# Patient Record
Sex: FEMALE
[De-identification: names, ages, dates, MRNs, and addresses within clinical notes are randomized per-mention and may not be internally consistent; named-entity substitution may affect disease eponyms.]

---

## 2017-02-10 ENCOUNTER — TRANSCRIPTION ENCOUNTER (OUTPATIENT)
Age: 42
End: 2017-02-10

## 2019-08-26 ENCOUNTER — FORM ENCOUNTER (OUTPATIENT)
Age: 44
End: 2019-08-26

## 2020-09-17 ENCOUNTER — FORM ENCOUNTER (OUTPATIENT)
Age: 45
End: 2020-09-17

## 2021-10-08 ENCOUNTER — FORM ENCOUNTER (OUTPATIENT)
Age: 46
End: 2021-10-08

## 2022-07-28 ENCOUNTER — FORM ENCOUNTER (OUTPATIENT)
Age: 47
End: 2022-07-28

## 2022-11-01 ENCOUNTER — FORM ENCOUNTER (OUTPATIENT)
Age: 47
End: 2022-11-01

## 2023-03-10 PROBLEM — Z00.00 ENCOUNTER FOR PREVENTIVE HEALTH EXAMINATION: Status: ACTIVE | Noted: 2023-03-10

## 2023-03-13 ENCOUNTER — APPOINTMENT (OUTPATIENT)
Dept: SURGERY | Facility: CLINIC | Age: 48
End: 2023-03-13
Payer: COMMERCIAL

## 2023-03-13 VITALS
SYSTOLIC BLOOD PRESSURE: 127 MMHG | BODY MASS INDEX: 26.2 KG/M2 | HEART RATE: 69 BPM | DIASTOLIC BLOOD PRESSURE: 86 MMHG | WEIGHT: 163 LBS | HEIGHT: 66 IN

## 2023-03-13 DIAGNOSIS — Z78.9 OTHER SPECIFIED HEALTH STATUS: ICD-10-CM

## 2023-03-13 PROCEDURE — 99203 OFFICE O/P NEW LOW 30 MIN: CPT

## 2023-03-13 NOTE — CONSULT LETTER
[Dear  ___] : Dear ~MUKUND, [Consult Letter:] : I had the pleasure of evaluating your patient, [unfilled]. [Consult Closing:] : Thank you very much for allowing me to participate in the care of this patient.  If you have any questions, please do not hesitate to contact me. [Sincerely,] : Sincerely, [FreeTextEntry3] : Santino Rodriguez MD\par

## 2023-03-13 NOTE — ASSESSMENT
[FreeTextEntry1] : Ms. FLORES is a 47 year y/o F who presents w cc of having a lump to the left axilla, on exam, she was found to have a palpable lymph node to the L axilla, w some discomfort w palpation.

## 2023-03-13 NOTE — HISTORY OF PRESENT ILLNESS
[de-identified] : ADRIAN FLORES is a 47 year old F who is referred to the office for consultation visit, she presents w the cc of having a lump to the left axilla, which she's felt for 1 year. She has soreness to the area, and notes an increase in size. She has a lot of discomfort, more recently. 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 Mass contains some non lipomatous elements.

## 2023-03-13 NOTE — DATA REVIEWED
[FreeTextEntry1] : Patient: ADRIAN FLORES\par YOB: 1975\par Phone: [phone]\par MRN: [id number] Acc: [id number]\par Date of Exam: 07-\par  \par EXAM: DIGITAL UNILATERAL LEFT DIAGNOSTIC MAMMOGRAM WITH CAD AND BREAST ULTRASOUND\par \par HISTORY: The patient is seen for palpable mass in the left axilla. There is no personal history of breast cancer. Family history of breast cancer: Maternal grandmother age 47.\par Age: 46 years old. \par \par CLINICAL BREAST EXAMINATION: The patient reports that her last clinical breast exam was within the past year. \par \par COMPARISON: Prior studies the most recent dated October 9, 2021.\par \par MAMMOGRAM:\par \par TECHNIQUE: Full-field digital mammography of the left breast was obtained. Additional imaging is composed of CC and MLO views. Spot compression MLO view of the left axilla. Computer-aided detection (CAD) was utilized. \par \par FINDINGS:\par BREAST COMPOSITION: The breasts are heterogeneously dense, which may obscure small masses. \par \par No suspicious masses, architectural distortion, or significant calcifications are detected. There is a fatty mass identified superficially in the left axilla measuring 2.6 cm compatible with a lipoma. \par \par BREAST ULTRASOUND:  \par \par TECHNIQUE: Targeted left breast ultrasound was performed. \par \par FINDINGS: \par BREAST ECHOTEXTURE: There is a heterogeneous background echotexture. \par \par There is a superficial mass predominantly fatty in composition measuring 2.2 cm corresponding to the mammographic finding. There are some non lipomatous elements including linear echogenic septations. No suspicious cystic or solid masses are seen in the visualized left breast tissue. No significant axillary lymphadenopathy is seen. \par \par IMPRESSION:\par \par 1. No mammographic or ultrasonographic evidence of breast malignancy.\par 2. 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 Mass contains some non lipomatous elements. Recommend surgical consultation for possible excision.  \par \par FOLLOW-UP: Annual screening. \par ASSESSMENT: BI-RADS Category 2:  Benign.

## 2023-03-13 NOTE — PLAN
[FreeTextEntry1] : most recent imaging from 07/22 reviewed \par all of the options ,risks and benefits were discussed, including surgical excision \par recommended repeat US of the L axillary palpable finding \par F/U after the study \par \par Patient's questions and concerns addressed to their satisfaction, and patient verbalized an understanding of the information discussed.\par

## 2023-03-13 NOTE — PHYSICAL EXAM
[No Rash or Lesion] : No rash or lesion [Alert] : alert [Oriented to Person] : oriented to person [Oriented to Place] : oriented to place [Oriented to Time] : oriented to time [Calm] : calm [de-identified] : A/Ox3; NAD. appears comfortable [de-identified] : EOMI; sclera anicteric. [de-identified] : no cervical lymphadenopathy  [de-identified] : airway patent, no use of accessory muscles [de-identified] : palpable lymph node, left axilla, tender w palpation  [de-identified] : abd is soft, NT/ND\par  [de-identified] : +ROM, normal gait

## 2023-03-23 ENCOUNTER — FORM ENCOUNTER (OUTPATIENT)
Age: 48
End: 2023-03-23

## 2023-04-04 ENCOUNTER — FORM ENCOUNTER (OUTPATIENT)
Age: 48
End: 2023-04-04

## 2023-05-08 PROBLEM — R22.32 MASS OF LEFT AXILLA: Status: ACTIVE | Noted: 2023-03-13

## 2023-05-09 ENCOUNTER — NON-APPOINTMENT (OUTPATIENT)
Age: 48
End: 2023-05-09

## 2023-05-09 ENCOUNTER — APPOINTMENT (OUTPATIENT)
Dept: BREAST CENTER | Facility: CLINIC | Age: 48
End: 2023-05-09
Payer: COMMERCIAL

## 2023-05-09 VITALS
DIASTOLIC BLOOD PRESSURE: 77 MMHG | WEIGHT: 170 LBS | SYSTOLIC BLOOD PRESSURE: 122 MMHG | BODY MASS INDEX: 28.32 KG/M2 | HEART RATE: 80 BPM | HEIGHT: 65 IN

## 2023-05-09 DIAGNOSIS — Z78.9 OTHER SPECIFIED HEALTH STATUS: ICD-10-CM

## 2023-05-09 DIAGNOSIS — R22.32 LOCALIZED SWELLING, MASS AND LUMP, LEFT UPPER LIMB: ICD-10-CM

## 2023-05-09 PROCEDURE — 99214 OFFICE O/P EST MOD 30 MIN: CPT

## 2023-05-09 NOTE — DATA REVIEWED
[FreeTextEntry1] : 11/2/22 B/L sMMG ([facility]): Scattered density. No mammographic evidence of malignancy. BIRADS 1 - Negative. Follow up with annual screening.\par \par 3/24/23 L Targeted US ([facility]): An abnormal left axillary lymph node at the area of palpable concern measures 11 x 7 x 10 mm. There is focal bulging of the cortex measuring 5 mm. The finding is suspicious.\par BIRADS 4. 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 Flow cytometric analysis interpretation: The B-cells comprise 22% of total cells analyzed and express polytypic surface immunoglobulin. The T-cell show no loss of pan T-cell antigens. The CD4:CD8 ratio is normal (5:1).  There is no evidence of a non-Hodgkin lymphoma by cell marker analysis. Benign, concordant recommend 6 month follow up US.

## 2023-05-09 NOTE — REASON FOR VISIT
[Consultation] : a consultation visit [FreeTextEntry1] : s/p benign biopsy of L axillary lymph node

## 2023-05-09 NOTE — ASSESSMENT
[FreeTextEntry1] : 48 yo F referred by Dr. Santino Rodriguez presents for consultation of L axillary lymph node with focal cortical thickening s/p L US biopsy on 4/5/23 results showed lymph node with reactive follicular lymphoid hyperplasia, benign and concordant, minimally palpated on physical exam today.  \par \par Patient is a 47 year female with a strong family history of breast cancer, lifetime KTAHY risk of 23.1%. Discussed with the patient the implications for her lifetime risk, which is considered to be at high risk to develop breast cancer over the span of her lifetime and it is recommended that she undergoes high risk screening surveillance to include biannual radiological screening exams with a mammogram and screening MRI.\par \par Plan for high risk B/L MRI now, and if benign, plan for B/L sMMG/US with targeted views to L axilla in Nov 2023. Patient verbalizes understanding and is in agreement with the plan.\par

## 2023-05-09 NOTE — PHYSICAL EXAM
[No Supraclavicular Adenopathy] : no supraclavicular adenopathy [Examined in the supine and seated position] : examined in the supine and seated position [Asymmetrical] : asymmetrical [No dominant masses] : no dominant masses in right breast  [No Nipple Retraction] : no left nipple retraction [No Nipple Discharge] : no left nipple discharge [No Axillary Lymphadenopathy] : no right axillary lymphadenopathy [Supple] : supple [Breast Nipple Inversion Right] : nipple inverted [de-identified] : R nipple inverted at baseline [de-identified] : minimally palpable lymph node

## 2023-05-09 NOTE — HISTORY OF PRESENT ILLNESS
[FreeTextEntry1] : 46 yo F referred by Dr. Santino Rodriguez presents for consultation of L axillary lymph node with focal cortical thickening s/p L US biopsy on 4/5/23 results showed lymph node with reactive follicular lymphoid hyperplasia, benign and concordant recommending 6 month follow up US. First palpated by patient in Feb 2022. Patient denies infection or vaccine prior to palpating the lymph node. Denies injuries to L arm. Patient states she does not have a cat. Denies systemic symptoms including fever, chills, fatigue, weight loss. Denies any history of breast surgeries or biopsies. Denies skin changes, nipple changes, or nipple discharge bilaterally.\par \par Patient reports family history of breast cancer in [facility] at age 45. Denies famhx of ovarian cancer. \par KATHY 23.1% \par \par \par

## 2023-05-09 NOTE — PAST MEDICAL HISTORY
[Menarche Age ____] : age at menarche was [unfilled] [Definite ___ (Date)] : the last menstrual period was [unfilled] [Regular Cycle Intervals] : have been regular [Total Preg ___] : G[unfilled] [Live Births ___] : P[unfilled]  [Age At Live Birth ___] : Age at live birth: [unfilled] [History of Hormone Replacement Treatment] : has no history of hormone replacement treatment [FreeTextEntry6] : no [FreeTextEntry7] : yes  [FreeTextEntry8] : yes

## 2023-06-20 ENCOUNTER — NON-APPOINTMENT (OUTPATIENT)
Age: 48
End: 2023-06-20

## 2023-11-07 ENCOUNTER — APPOINTMENT (OUTPATIENT)
Dept: BREAST CENTER | Facility: CLINIC | Age: 48
End: 2023-11-07
Payer: COMMERCIAL

## 2023-11-07 VITALS
HEART RATE: 80 BPM | BODY MASS INDEX: 28.66 KG/M2 | SYSTOLIC BLOOD PRESSURE: 112 MMHG | HEIGHT: 65 IN | DIASTOLIC BLOOD PRESSURE: 74 MMHG | WEIGHT: 172 LBS

## 2023-11-07 DIAGNOSIS — R92.8 OTHER ABNORMAL AND INCONCLUSIVE FINDINGS ON DIAGNOSTIC IMAGING OF BREAST: ICD-10-CM

## 2023-11-07 PROCEDURE — 99213 OFFICE O/P EST LOW 20 MIN: CPT

## 2023-11-17 ENCOUNTER — NON-APPOINTMENT (OUTPATIENT)
Age: 48
End: 2023-11-17

## 2024-01-09 ENCOUNTER — NON-APPOINTMENT (OUTPATIENT)
Age: 49
End: 2024-01-09

## 2024-03-23 ENCOUNTER — FORM ENCOUNTER (OUTPATIENT)
Age: 49
End: 2024-03-23

## 2024-04-04 ENCOUNTER — FORM ENCOUNTER (OUTPATIENT)
Age: 49
End: 2024-04-04

## 2024-05-20 ENCOUNTER — NON-APPOINTMENT (OUTPATIENT)
Age: 49
End: 2024-05-20

## 2024-05-23 PROBLEM — Z91.89 AT HIGH RISK FOR BREAST CANCER: Status: ACTIVE | Noted: 2023-05-09

## 2024-05-23 PROBLEM — Z80.3 FAMILY HISTORY OF MALIGNANT NEOPLASM OF BREAST: Status: ACTIVE | Noted: 2023-05-09

## 2024-05-23 PROBLEM — Z98.890 S/P BREAST BIOPSY, RIGHT: Status: ACTIVE | Noted: 2023-11-17

## 2024-05-24 ENCOUNTER — NON-APPOINTMENT (OUTPATIENT)
Age: 49
End: 2024-05-24

## 2024-05-28 ENCOUNTER — APPOINTMENT (OUTPATIENT)
Dept: BREAST CENTER | Facility: CLINIC | Age: 49
End: 2024-05-28
Payer: COMMERCIAL

## 2024-05-28 VITALS
SYSTOLIC BLOOD PRESSURE: 122 MMHG | HEIGHT: 65 IN | DIASTOLIC BLOOD PRESSURE: 80 MMHG | WEIGHT: 174 LBS | BODY MASS INDEX: 28.99 KG/M2 | HEART RATE: 71 BPM

## 2024-05-28 DIAGNOSIS — Z98.890 OTHER SPECIFIED POSTPROCEDURAL STATES: ICD-10-CM

## 2024-05-28 DIAGNOSIS — Z80.3 FAMILY HISTORY OF MALIGNANT NEOPLASM OF BREAST: ICD-10-CM

## 2024-05-28 DIAGNOSIS — Z91.89 OTHER SPECIFIED PERSONAL RISK FACTORS, NOT ELSEWHERE CLASSIFIED: ICD-10-CM

## 2024-05-28 PROCEDURE — 99213 OFFICE O/P EST LOW 20 MIN: CPT

## 2024-06-05 NOTE — PHYSICAL EXAM
[de-identified] : R nipple inverted at baseline [de-identified] : minimally palpable lymph node

## 2024-06-05 NOTE — PAST MEDICAL HISTORY
[History of Hormone Replacement Treatment] : has no history of hormone replacement treatment [FreeTextEntry6] : no [FreeTextEntry7] : yes  [FreeTextEntry8] : yes

## 2024-06-05 NOTE — ASSESSMENT
[FreeTextEntry1] : 49 [facility] b/l benign biopsies-- of L axillary LN in April 2023 and of R breast mass in November 2023.   KATHY 23.1%. Patient without complaint. Physical exam WNL. Most recent imaging reviewed, R DX MMG/US 5/28/24  BIRADS-2 and B/L high risk MRI 5/28/24 is still pending -- will f/u with results. Plan for B/L sMMG/US and re-examination in Nov 2024. Patient verbalizes understanding and is in agreement with the plan.

## 2024-06-05 NOTE — HISTORY OF PRESENT ILLNESS
[FreeTextEntry1] : 49 yo F presents for follow up for screening with famhx of breast cancer and personal h/o b/l biopsies, noted below. Denies any history of breast surgeries. Denies skin changes, nipple changes, or nipple discharge bilaterally.  Biopsy Hx:  - L axillary lymph node with focal cortical thickening s/p L US biopsy on on 4/5/23, pathology results showed lymph node with reactive follicular lymphoid hyperplasia, benign and concordant  First palpated by patient in Feb 2022. Patient denied infection or vaccine prior to palpating the lymph node. Denied injuries to L arm. Patient states she does not have a cat. Denied systemic symptoms including fever, chills, fatigue, weight loss.  - US-guided biopsy on 11/8/23 of 0.8 cm complex hypoechoic mass or intraductal filling defect at the right 2:00 retroareolar region, path revealed benign breast parenchyma with nodular stromal fibrosis; benign and concordant; initially seen on screening US.  Imaging: B/L sMMG/US 11/7/23 BIRADS-4A prompted biopsy noted above. Most recent:  -- R DX MMG/US 5/28/24 to follow up benign biopsy, BIRADS-2. -- B/L high risk MRI 5/28/24 is still pending.  Patient reports family history of [place] [facility] [place] 45. Denies famhx of ovarian cancer.  KATHY 23.1%

## 2024-06-05 NOTE — DATA REVIEWED
[FreeTextEntry1] : 11/2/22 B/L sMMG (R): Scattered density. No mammographic evidence of malignancy. BIRADS 1 - Negative. Follow up with annual screening.  3/24/23 L Targeted US ([facility]): An abnormal left axillary lymph node at the area of palpable concern measures 11 x 7 x 10 mm. There is focal bulging of the cortex measuring 5 mm. The finding is suspicious. BIRADS 4.   4/5/23 L US Biopsy ([facility]): Left axilla shows lymph node with reactive follicular lymphoid hyperplasia. Flow cytometric analysis interpretation: The B-cells comprise 22% of total cells analyzed and express polytypic surface immunoglobulin. The T-cell show no loss of pan T-cell antigens. The CD4:CD8 ratio is normal (5:1).  There is no evidence of a non-Hodgkin lymphoma by cell marker analysis. Benign, concordant recommend 6 month follow up US.  6/15/23 B/L MRI (R)-  No MR evidence of malignancy.  Inverted right nipple has a similar appearance since prior mammograms dated back to 12/22/2016.Within the right middle lobe, there is patchy T2 signal, which likely corresponds to an area of bronchiectasis described on CT dated 3/26/2022 and 11/2/2022. BIRADS 2  11/7/23 B/L sMMG/US (R)- scattered areas of fbg density. In the right retroareolar region there is a small solid mass which measures 0.6 cm x 0.4 cm x 0.8 cm. This may be a fibroadenoma or papilloma. An ultrasound-guided needle core biopsy is recommended. The patient is aware of this recommendation. FOLLOW-UP:  Ultrasound guided biopsy. BI-RADS 4:  Suspicious. Subcategory 4A-low degree of concern for malignancy  11/8/23 ([facility]) US-guided biopsy of 0.8 cm complex hypoechoic mass or intraductal filling defect at the right 2 o'clock retroareolar region (heart clip): benign breast parenchyma with nodular stromal fibrosis; benign and concordant. A six-month follow up right mammogram and ultrasound is recommended.  5/28/24 (R) R DX MMG/US: scattered areas of fbg density. In the right retroareolar region there is a stable 9 mm mass containing a clip. IMPRESSION: No suspicious findings in the right breast. The clip from the biopsy is noted.  FOLLOW-UP:  The patient should return for her bilateral mammogram in November. BI-RADS Category 2:  Benign.   5/28/24 (R) B/L MRI: PENDING

## 2024-06-21 ENCOUNTER — OFFICE (OUTPATIENT)
Dept: URBAN - METROPOLITAN AREA CLINIC 35 | Facility: CLINIC | Age: 49
Setting detail: OPHTHALMOLOGY
End: 2024-06-21
Payer: COMMERCIAL

## 2024-06-21 DIAGNOSIS — H52.4: ICD-10-CM

## 2024-06-21 DIAGNOSIS — D21.0: ICD-10-CM

## 2024-06-21 PROBLEM — Z41.1 ENCOUNTER FOR COSMETIC SURGERY: Status: ACTIVE | Noted: 2024-06-21

## 2024-06-21 PROCEDURE — 92002 INTRM OPH EXAM NEW PATIENT: CPT | Performed by: OPHTHALMOLOGY

## 2024-06-21 PROCEDURE — 92285 EXTERNAL OCULAR PHOTOGRAPHY: CPT | Performed by: OPHTHALMOLOGY

## 2024-06-21 ASSESSMENT — CONFRONTATIONAL VISUAL FIELD TEST (CVF)
OS_FINDINGS: FULL
OD_FINDINGS: FULL

## 2024-07-08 ENCOUNTER — OFFICE (OUTPATIENT)
Dept: URBAN - METROPOLITAN AREA CLINIC 76 | Facility: CLINIC | Age: 49
Setting detail: OPHTHALMOLOGY
End: 2024-07-08
Payer: COMMERCIAL

## 2024-07-08 DIAGNOSIS — H16.223: ICD-10-CM

## 2024-07-08 DIAGNOSIS — D21.0: ICD-10-CM

## 2024-07-08 DIAGNOSIS — H52.4: ICD-10-CM

## 2024-07-08 PROCEDURE — 92014 COMPRE OPH EXAM EST PT 1/>: CPT | Performed by: OPHTHALMOLOGY

## 2024-07-08 PROCEDURE — 92015 DETERMINE REFRACTIVE STATE: CPT | Performed by: OPHTHALMOLOGY

## 2024-07-08 ASSESSMENT — CONFRONTATIONAL VISUAL FIELD TEST (CVF)
OD_FINDINGS: FULL
OS_FINDINGS: FULL

## 2024-07-27 ENCOUNTER — DOCTOR'S OFFICE (OUTPATIENT)
Age: 49
Setting detail: OPHTHALMOLOGY
End: 2024-07-27
Payer: COMMERCIAL

## 2024-07-27 DIAGNOSIS — D21.0: ICD-10-CM

## 2024-07-27 DIAGNOSIS — H16.223: ICD-10-CM

## 2024-07-27 PROCEDURE — 92012 INTRM OPH EXAM EST PATIENT: CPT | Performed by: OPHTHALMOLOGY

## 2024-07-27 PROCEDURE — 83861 MICROFLUID ANALY TEARS: CPT | Performed by: OPHTHALMOLOGY

## 2024-07-27 ASSESSMENT — CONFRONTATIONAL VISUAL FIELD TEST (CVF)
OS_FINDINGS: FULL
OD_FINDINGS: FULL

## 2024-11-19 ENCOUNTER — APPOINTMENT (OUTPATIENT)
Dept: BREAST CENTER | Facility: CLINIC | Age: 49
End: 2024-11-19
Payer: COMMERCIAL

## 2024-11-19 ENCOUNTER — APPOINTMENT (OUTPATIENT)
Dept: BREAST CENTER | Facility: CLINIC | Age: 49
End: 2024-11-19

## 2024-11-19 VITALS
DIASTOLIC BLOOD PRESSURE: 82 MMHG | WEIGHT: 178 LBS | BODY MASS INDEX: 29.66 KG/M2 | HEIGHT: 65 IN | SYSTOLIC BLOOD PRESSURE: 116 MMHG | HEART RATE: 101 BPM

## 2024-11-19 DIAGNOSIS — Z80.3 FAMILY HISTORY OF MALIGNANT NEOPLASM OF BREAST: ICD-10-CM

## 2024-11-19 DIAGNOSIS — R92.8 OTHER ABNORMAL AND INCONCLUSIVE FINDINGS ON DIAGNOSTIC IMAGING OF BREAST: ICD-10-CM

## 2024-11-19 DIAGNOSIS — Z98.890 OTHER SPECIFIED POSTPROCEDURAL STATES: ICD-10-CM

## 2024-11-19 DIAGNOSIS — Z91.89 OTHER SPECIFIED PERSONAL RISK FACTORS, NOT ELSEWHERE CLASSIFIED: ICD-10-CM

## 2024-11-19 PROCEDURE — 99213 OFFICE O/P EST LOW 20 MIN: CPT

## 2025-05-19 ENCOUNTER — NON-APPOINTMENT (OUTPATIENT)
Age: 50
End: 2025-05-19

## 2025-05-20 ENCOUNTER — APPOINTMENT (OUTPATIENT)
Dept: BREAST CENTER | Facility: CLINIC | Age: 50
End: 2025-05-20
Payer: COMMERCIAL

## 2025-05-20 ENCOUNTER — APPOINTMENT (OUTPATIENT)
Dept: BREAST CENTER | Facility: CLINIC | Age: 50
End: 2025-05-20

## 2025-05-20 VITALS
SYSTOLIC BLOOD PRESSURE: 115 MMHG | BODY MASS INDEX: 30.82 KG/M2 | HEART RATE: 97 BPM | WEIGHT: 185 LBS | HEIGHT: 64.84 IN | DIASTOLIC BLOOD PRESSURE: 79 MMHG

## 2025-05-20 DIAGNOSIS — Z91.89 OTHER SPECIFIED PERSONAL RISK FACTORS, NOT ELSEWHERE CLASSIFIED: ICD-10-CM

## 2025-05-20 DIAGNOSIS — Z80.3 FAMILY HISTORY OF MALIGNANT NEOPLASM OF BREAST: ICD-10-CM

## 2025-05-20 DIAGNOSIS — R22.32 LOCALIZED SWELLING, MASS AND LUMP, LEFT UPPER LIMB: ICD-10-CM

## 2025-05-20 PROCEDURE — 99213 OFFICE O/P EST LOW 20 MIN: CPT

## 2025-06-26 ENCOUNTER — NON-APPOINTMENT (OUTPATIENT)
Age: 50
End: 2025-06-26